# Patient Record
Sex: MALE | Race: WHITE
[De-identification: names, ages, dates, MRNs, and addresses within clinical notes are randomized per-mention and may not be internally consistent; named-entity substitution may affect disease eponyms.]

---

## 2021-07-02 ENCOUNTER — HOSPITAL ENCOUNTER (OUTPATIENT)
Dept: HOSPITAL 47 - ORWHC2ENDO | Age: 61
End: 2021-07-02
Attending: SURGERY
Payer: COMMERCIAL

## 2021-07-02 VITALS — TEMPERATURE: 98 F | RESPIRATION RATE: 16 BRPM

## 2021-07-02 VITALS — SYSTOLIC BLOOD PRESSURE: 119 MMHG | DIASTOLIC BLOOD PRESSURE: 74 MMHG | HEART RATE: 70 BPM

## 2021-07-02 VITALS — BODY MASS INDEX: 23 KG/M2

## 2021-07-02 DIAGNOSIS — K21.9: ICD-10-CM

## 2021-07-02 DIAGNOSIS — E78.5: ICD-10-CM

## 2021-07-02 DIAGNOSIS — K92.2: Primary | ICD-10-CM

## 2021-07-02 PROCEDURE — 74018 RADEX ABDOMEN 1 VIEW: CPT

## 2021-07-02 PROCEDURE — 45378 DIAGNOSTIC COLONOSCOPY: CPT

## 2021-07-02 NOTE — XR
EXAMINATION TYPE: XR abdomen 1V

 

DATE OF EXAM: 7/2/2021

 

COMPARISON: NONE

 

HISTORY: Incomplete Colonoscopy/ BE Prelim 

 

 

TECHNIQUE: Single supine KUB image of the abdomen is obtained

 

FINDINGS:  

Given extensive air throughout the colon barium enema could not be performed and will be rescheduled.


 

IMPRESSION:  

 

1.  As above

## 2021-07-02 NOTE — P.GSHP
History of Present Illness


H&P Date: 07/02/21


Chief Complaint: Rectal bleeding





This is a 61-year-old male who presents today for colonoscopy.  He's had issues 

with rectal bleeding.





Past Medical History


Past Medical History: GERD/Reflux, Hyperlipidemia


Additional Past Medical History / Comment(s): KIDNEY STONES


History of Any Multi-Drug Resistant Organisms: None Reported


Additional Past Surgical History / Comment(s): NECK SURGERY- DISC SURGERY , 

RIGHT GREAT TOE SURGERY


Past Anesthesia/Blood Transfusion Reactions: No Reported Reaction


Smoking Status: Never smoker





- Past Family History


  ** Mother


Family Medical History: No Reported History





Medications and Allergies


                                Home Medications











 Medication  Instructions  Recorded  Confirmed  Type


 


No Known Home Medications  06/30/21 07/02/21 History








                                    Allergies











Allergy/AdvReac Type Severity Reaction Status Date / Time


 


No Known Allergies Allergy   Verified 07/02/21 10:30














Surgical - Exam


                                   Vital Signs











Temp Pulse Resp BP Pulse Ox


 


 98.0 F   70   16   142/84   99 


 


 07/02/21 10:45  07/02/21 10:45  07/02/21 10:45  07/02/21 10:45  07/02/21 10:45














- General


well developed, well nourished, no distress





- Eyes


PERRL





- ENT


normal pinna





- Neck


no masses





- Respiratory


normal expansion





- Cardiovascular


Rhythm: regular





- Abdomen


Abdomen: soft, non tender





Assessment and Plan


Assessment: 





Rectal bleeding..  We'll perform colonoscopy.

## 2021-07-02 NOTE — P.OP
Date of Procedure: 07/02/21


Preoperative Diagnosis: 


GI bleed


Postoperative Diagnosis: 


Normal colonoscopy to right colon


Procedure(s) Performed: 


Colonoscopy


Anesthesia: MAC


Surgeon: Gomez Sim


Pathology: none sent


Condition: stable


Disposition: PACU


Description of Procedure: 


The patient's placed on the endoscopy table lateral position.  He received IV 

sedation.  Digital rectal exam was performed which revealed no abnormalities.  

Flexible colonoscope was then placed patient anus passed throughout the colon.  

The scope passed into the distal right colon secondary to tortuous valve.  

Several times made to be related colonoscope was impossible.  Scope was 

withdrawn.  The ascending colon, transverse colon, descending colon and sigmoid 

colon appeared normal.  Scope was brought back the rectum was normal.  Scope was

withdrawn for patient.  There is no bleeding colon.